# Patient Record
Sex: MALE | Race: BLACK OR AFRICAN AMERICAN | NOT HISPANIC OR LATINO | URBAN - METROPOLITAN AREA
[De-identification: names, ages, dates, MRNs, and addresses within clinical notes are randomized per-mention and may not be internally consistent; named-entity substitution may affect disease eponyms.]

---

## 2018-07-01 ENCOUNTER — HOSPITAL ENCOUNTER (EMERGENCY)
Facility: HOSPITAL | Age: 28
Discharge: HOME | End: 2018-07-01
Attending: EMERGENCY MEDICINE

## 2018-07-01 VITALS
RESPIRATION RATE: 16 BRPM | DIASTOLIC BLOOD PRESSURE: 89 MMHG | HEIGHT: 75 IN | SYSTOLIC BLOOD PRESSURE: 141 MMHG | HEART RATE: 65 BPM | BODY MASS INDEX: 21.76 KG/M2 | OXYGEN SATURATION: 99 % | TEMPERATURE: 98 F | WEIGHT: 175 LBS

## 2018-07-01 DIAGNOSIS — S02.5XXA CLOSED FRACTURE OF TOOTH, INITIAL ENCOUNTER: Primary | ICD-10-CM

## 2018-07-01 PROCEDURE — 63600175 PHARM REV CODE 636 W HCPCS: Performed by: EMERGENCY MEDICINE

## 2018-07-01 PROCEDURE — 96372 THER/PROPH/DIAG INJ SC/IM: CPT

## 2018-07-01 PROCEDURE — 99283 EMERGENCY DEPT VISIT LOW MDM: CPT | Mod: 25

## 2018-07-01 RX ORDER — KETOROLAC TROMETHAMINE 30 MG/ML
30 INJECTION, SOLUTION INTRAMUSCULAR; INTRAVENOUS
Status: COMPLETED | OUTPATIENT
Start: 2018-07-01 | End: 2018-07-01

## 2018-07-01 RX ORDER — PENICILLIN V POTASSIUM 500 MG/1
500 TABLET, FILM COATED ORAL 4 TIMES DAILY
Qty: 28 TABLET | Refills: 0 | Status: SHIPPED | OUTPATIENT
Start: 2018-07-01 | End: 2018-07-08

## 2018-07-01 RX ADMIN — KETOROLAC TROMETHAMINE 30 MG: 30 INJECTION, SOLUTION INTRAMUSCULAR at 10:07

## 2018-07-01 NOTE — ED PROVIDER NOTES
Encounter Date: 7/1/2018    SCRIBE #1 NOTE: I, Placido Jaeger, am scribing for, and in the presence of,  Dr. Johnson . I have scribed the entire note.       History     Chief Complaint   Patient presents with    Dental Problem     chipped tooth yesterday and complaints of pain.     This is a 27 y.o. male who presents with complaint of dental pain that began 3 days ago. He reports that he cracked his tooth and that he has had a constant aching and throbbing pain to the area since, which has progressively worsened. It is constant, worse with mastication and cold fluids.  He notes that the pain caused him to wake up this morning. He had some relief overnight with diclofenac, but his pain returned this morning. He has an appointment with a dentist tomorrow.       The history is provided by the patient.     Review of patient's allergies indicates:  No Known Allergies  History reviewed. No pertinent past medical history.  History reviewed. No pertinent surgical history.  No family history on file.  Social History   Substance Use Topics    Smoking status: Current Every Day Smoker    Smokeless tobacco: Not on file    Alcohol use Yes      Comment: social     Review of Systems   Constitutional: Negative for chills, fatigue and fever.   HENT: Positive for dental problem. Negative for congestion, sore throat and voice change.    Eyes: Negative for photophobia, pain and redness.   Cardiovascular: Negative for chest pain, palpitations and leg swelling.   Gastrointestinal: Negative for abdominal pain, diarrhea, nausea and vomiting.   Genitourinary: Negative for dysuria, frequency and urgency.   Musculoskeletal: Negative for back pain, neck pain and neck stiffness.   Neurological: Negative for seizures, speech difficulty, light-headedness, numbness and headaches.   All other systems reviewed and are negative.      Physical Exam     Initial Vitals [07/01/18 1021]   BP Pulse Resp Temp SpO2   136/83 62 16 98 °F (36.7 °C) 100 %       MAP       --         Physical Exam    Nursing note and vitals reviewed.  Constitutional: He appears well-developed and well-nourished.   HENT:   Head: Normocephalic and atraumatic.   Mouth/Throat: Dental caries present.   Cracked lower right tooth, with some mild surrounding gingival erythema   Eyes: Conjunctivae and EOM are normal. Pupils are equal, round, and reactive to light.   Neck: Normal range of motion. Neck supple. No tracheal deviation present.   Cardiovascular: Normal rate, regular rhythm, normal heart sounds and intact distal pulses.   Pulmonary/Chest: Breath sounds normal. No respiratory distress. He has no wheezes. He has no rhonchi. He has no rales.   Abdominal: Soft. Bowel sounds are normal. He exhibits no distension. There is no tenderness.   Musculoskeletal: Normal range of motion. He exhibits no edema or tenderness.   Neurological: He is alert and oriented to person, place, and time. He has normal strength. No cranial nerve deficit or sensory deficit.   Skin: Skin is warm and dry. Capillary refill takes less than 2 seconds. No pallor.         ED Course   Procedures  Labs Reviewed - No data to display       Imaging Results    None          Medical Decision Making:   Initial Assessment:   27-year-old male presents emergency department complaining of pain to a cracked right lower tooth  Differential Diagnosis:   Fractured tooth, dental abscess, dental pain, dental caries  ED Management:  Patient given IM Toradol and is feeling somewhat better at this time.  Given a prescription for penicillin, instructed to follow up with his dentist tomorrow scheduled, instructed on home management, reasons to return to the emergency room.                      Clinical Impression:     1. Closed fracture of tooth, initial encounter      Disposition:   Disposition: Discharged  Condition: Stable    Scribe Attestation  I, Dr. Nato Johnson, personally performed the services described in this documentation. All  medical record entries made by the scribe were at my direction and in my presence.  I have reviewed the chart and agree that the record reflects my personal performance and is accurate and complete. Nato Johnson MD.  10:44 AM 07/01/2018                        Nato Johnson MD  07/01/18 1044

## 2018-07-01 NOTE — ED NOTES
"Patient presents to ED with c/o dental pain. Patient states that he had been eating oysters prior to coming to ED when his tooth "broke." Physical assessment findings that one of his back molars are missing. Patient also noted to have multiple dental caries. No redness or swelling noted to site of broken tooth. Will continue to monitor patient.   "

## 2018-07-01 NOTE — DISCHARGE INSTRUCTIONS
After dinner I recommend taking ibuprofen 600mg every 8 hours with food until you can be seen by your dentist. Please do not take ibuprofen with diclofenac